# Patient Record
Sex: MALE | Race: WHITE
[De-identification: names, ages, dates, MRNs, and addresses within clinical notes are randomized per-mention and may not be internally consistent; named-entity substitution may affect disease eponyms.]

---

## 2017-08-14 LAB
ANION GAP SERPL CALC-SCNC: 5 MMOL/L (ref 3–11)
APPEARANCE UR: CLEAR
BASOPHILS # BLD: 0.04 K/UL (ref 0–0.2)
BASOPHILS NFR BLD: 0.6 %
BILIRUB UR-MCNC: (no result) MG/DL
BUN SERPL-MCNC: 26 MG/DL (ref 7–18)
BUN/CREAT SERPL: 25.6 (ref 10–20)
CALCIUM SERPL-MCNC: 8.9 MG/DL (ref 8.5–10.1)
CHLORIDE SERPL-SCNC: 106 MMOL/L (ref 98–107)
CO2 SERPL-SCNC: 29 MMOL/L (ref 21–32)
COLOR UR: YELLOW
COMPLETE: YES
CREAT CL PREDICTED SERPL C-G-VRATE: 110.8 ML/MIN
CREAT SERPL-MCNC: 1 MG/DL (ref 0.6–1.4)
EOSINOPHIL NFR BLD AUTO: 257 K/UL (ref 130–400)
GLUCOSE SERPL-MCNC: 117 MG/DL (ref 70–99)
HCT VFR BLD CALC: 43.9 % (ref 42–52)
IG%: 0.2 %
IMM GRANULOCYTES NFR BLD AUTO: 20.8 %
INR PPP: 1 (ref 0.9–1.1)
LYMPHOCYTES # BLD: 1.31 K/UL (ref 1.2–3.4)
MANUAL MICROSCOPIC REQUIRED?: NO
MCH RBC QN AUTO: 29.9 PG (ref 25–34)
MCHC RBC AUTO-ENTMCNC: 33.3 G/DL (ref 32–36)
MCV RBC AUTO: 90 FL (ref 80–100)
MONOCYTES NFR BLD: 8.6 %
NEUTROPHILS # BLD AUTO: 7.6 %
NEUTROPHILS NFR BLD AUTO: 62.2 %
NITRITE UR QL STRIP: (no result)
PARTIAL THROMBOPLASTIN RATIO: 1
PH UR STRIP: 6 [PH] (ref 4.5–7.5)
PMV BLD AUTO: 9.8 FL (ref 7.4–10.4)
POTASSIUM SERPL-SCNC: 4.1 MMOL/L (ref 3.5–5.1)
PROTHROMBIN TIME: 10.7 SECONDS (ref 9–12)
RBC # BLD AUTO: 4.88 M/UL (ref 4.7–6.1)
REVIEW REQ?: NO
SODIUM SERPL-SCNC: 140 MMOL/L (ref 136–145)
SP GR UR STRIP: 1.02 (ref 1–1.03)
URINE BILL WITH OR WITHOUT MIC: (no result)
UROBILINOGEN UR-MCNC: (no result) MG/DL
WBC # BLD AUTO: 6.3 K/UL (ref 4.8–10.8)

## 2017-08-14 NOTE — DIAGNOSTIC IMAGING REPORT
TWO VIEW CHEST



CLINICAL HISTORY: Preoperative examination.



FINDINGS: PA and lateral chest radiographs are compared to study dated

9/14/2015. The examination is degraded by large body habitus. The heart appears

enlarged. The pulmonary vasculature is noncongested. There is mild bibasilar

atelectasis.  The lungs and pleural spaces are otherwise clear. There is no

pneumothorax. The skeletal structures are osteopenic. Degenerative change is

seen throughout the thoracic spine.



IMPRESSION: Cardiac enlargement with no active disease in the chest.







Electronically signed by:  Rubén García M.D.

8/14/2017 2:00 PM



Dictated Date/Time:  8/14/2017 1:59 PM

## 2017-08-14 NOTE — PAT MEDICATION INSTRUCTIONS
Service Date


Aug 14, 2017.





Current Home Medication List


Aspirin Enteric Coated (Ecotrin Or Generic), 81 MG PO QAM


Atenolol (Tenormin), 25 MG PO QAM


Naproxen (Aleve), 440 MG PO BID PRN for rn


Simvastatin (Zocor), 20 MG PO QAM





Medication Instructions


For Your Scheduled Surgery 





- Check with surgeon for instructions: 


Naproxen (Aleve), 440 MG PO BID PRN for rn








- Take the following medications the morning of surgery with a sip of water:


Simvastatin (Zocor), 20 MG PO QAM


Aspirin Enteric Coated (Ecotrin Or Generic), 81 MG PO QAM (okay to continue per 

surgeon)


Atenolol (Tenormin), 25 MG PO QAM








If you have any questions please call us at [phone] or [phone] or 

[phone]

## 2017-08-15 LAB — EST. AVERAGE GLUCOSE BLD GHB EST-MCNC: 120 MG/DL

## 2017-09-06 NOTE — HISTORY AND PHYSICAL
History & Physical


Date


Sep 6, 2017.





Chief Complaint


Right knee pain





History of Present Illness


The patient is a 66 year old male with complaints of right knee pain. He states 

the pain has been going on for some time now. He describes the pain as constant 

dull ache and sharp at times. He has tried PT, cortisone injections, and NSAIDs 

with no relief. He would like to proceed with a Right TKA.





Past Medical/Surgical History


Medical Problems:


(1) Diabetes


(2) Hypertension


(3) Sleep apnea


(4) Hypercholesterolemia


(5) H/O kidney stones





PSHx:


(1) Right ankle ORIF


(2) hernia repair





Additional History


Hepatic Disease:  No


Endocrine Disorder:  No


Kidney Disease:  No


Hypertension:  Yes


Heart Disease:  No


Bleeding Tendencies:  No


Infectious Diseases:  No





Allergies


Coded Allergies:  


     No Known Allergies (Unverified , 8/14/17)





Home Medications


Scheduled


Aspirin Enteric Coated (Ecotrin Or Generic), 81 MG PO QAM


Metoprolol Succ (Toprol Xl) (Toprol-Xl), 50 MG PO QAM


Simvastatin (Zocor), 20 MG PO QAM





Scheduled PRN


Naproxen (Aleve), 440 MG PO BID PRN for rn





Physical Examination


Skin:  warm/dry, no rash


Eyes:  normal inspection, EOMI


ENT:  normal ENT inspection


Head:  normocephalic, atraumatic


Neck:  supple, no adenopathy


Respiratory/Chest:  lungs clear, normal breath sounds


Cardiovascular:  regular rate, rhythm, no murmur


Abdomen / GI:  normal bowel sounds, non tender


Extremities:  normal inspection, + pertinent finding (0-90 degrees ROM of the 

right knee with anterior and medial right knee pain ligaments are intact)


Neurologic/Psych:  alert, oriented x 3





Diagnosis


Primary osteoarthritis of the right knee





Plan of Treatment


Patient is scheduled for a Right total knee arthroplasty. He has failed 

conservative therapies that include NSAIDs, PT, and cortisone injections. He 

would like to proceed with a right total knee arthroplasty. Risks and benefits 

to surgery were discussed that include but not limited to Pain, DVT, stiffness, 

Needed for revision surgery, blood loss, blood vessel damage, infection, nerve 

damage, anesthesia risks, and death. He understands these risks and wishes to 

proceed. All questions were answered to his satisfaction. DVT prophylaxis - ASA 

81mg BID Discharge Home with home health.

## 2017-09-08 ENCOUNTER — HOSPITAL ENCOUNTER (INPATIENT)
Dept: HOSPITAL 45 - C.ACU | Age: 67
LOS: 1 days | Discharge: HOME HEALTH | DRG: 470 | End: 2017-09-09
Attending: ORTHOPAEDIC SURGERY | Admitting: ORTHOPAEDIC SURGERY
Payer: COMMERCIAL

## 2017-09-08 VITALS
SYSTOLIC BLOOD PRESSURE: 134 MMHG | DIASTOLIC BLOOD PRESSURE: 81 MMHG | OXYGEN SATURATION: 97 % | TEMPERATURE: 97.52 F | HEART RATE: 57 BPM

## 2017-09-08 VITALS
OXYGEN SATURATION: 96 % | DIASTOLIC BLOOD PRESSURE: 79 MMHG | TEMPERATURE: 97.7 F | SYSTOLIC BLOOD PRESSURE: 136 MMHG | HEART RATE: 64 BPM

## 2017-09-08 VITALS
HEART RATE: 86 BPM | DIASTOLIC BLOOD PRESSURE: 84 MMHG | TEMPERATURE: 97.7 F | OXYGEN SATURATION: 94 % | SYSTOLIC BLOOD PRESSURE: 123 MMHG

## 2017-09-08 VITALS
WEIGHT: 315 LBS | BODY MASS INDEX: 41.75 KG/M2 | BODY MASS INDEX: 41.75 KG/M2 | WEIGHT: 315 LBS | HEIGHT: 73 IN | HEIGHT: 73 IN

## 2017-09-08 VITALS
SYSTOLIC BLOOD PRESSURE: 135 MMHG | DIASTOLIC BLOOD PRESSURE: 90 MMHG | TEMPERATURE: 97.52 F | OXYGEN SATURATION: 94 % | HEART RATE: 82 BPM

## 2017-09-08 VITALS
OXYGEN SATURATION: 94 % | HEART RATE: 65 BPM | TEMPERATURE: 98.24 F | SYSTOLIC BLOOD PRESSURE: 149 MMHG | DIASTOLIC BLOOD PRESSURE: 86 MMHG

## 2017-09-08 VITALS
DIASTOLIC BLOOD PRESSURE: 89 MMHG | HEART RATE: 62 BPM | SYSTOLIC BLOOD PRESSURE: 184 MMHG | OXYGEN SATURATION: 96 % | TEMPERATURE: 98.06 F

## 2017-09-08 VITALS — SYSTOLIC BLOOD PRESSURE: 152 MMHG | OXYGEN SATURATION: 97 % | HEART RATE: 61 BPM | DIASTOLIC BLOOD PRESSURE: 90 MMHG

## 2017-09-08 VITALS — SYSTOLIC BLOOD PRESSURE: 142 MMHG | DIASTOLIC BLOOD PRESSURE: 81 MMHG | HEART RATE: 59 BPM | OXYGEN SATURATION: 96 %

## 2017-09-08 DIAGNOSIS — I10: ICD-10-CM

## 2017-09-08 DIAGNOSIS — M17.11: Primary | ICD-10-CM

## 2017-09-08 DIAGNOSIS — E78.00: ICD-10-CM

## 2017-09-08 DIAGNOSIS — Z79.82: ICD-10-CM

## 2017-09-08 DIAGNOSIS — Z79.899: ICD-10-CM

## 2017-09-08 DIAGNOSIS — E11.9: ICD-10-CM

## 2017-09-08 PROCEDURE — 0SRC0J9 REPLACEMENT OF RIGHT KNEE JOINT WITH SYNTHETIC SUBSTITUTE, CEMENTED, OPEN APPROACH: ICD-10-PCS | Performed by: ORTHOPAEDIC SURGERY

## 2017-09-08 RX ADMIN — FERROUS GLUCONATE SCH MG: 324 TABLET ORAL at 20:18

## 2017-09-08 RX ADMIN — CEFAZOLIN SCH MLS/HR: 10 INJECTION, POWDER, FOR SOLUTION INTRAVENOUS at 17:39

## 2017-09-08 RX ADMIN — OXYCODONE HYDROCHLORIDE SCH MG: 10 TABLET, FILM COATED, EXTENDED RELEASE ORAL at 22:02

## 2017-09-08 RX ADMIN — TRANEXAMIC ACID SCH MLS/HR: 100 INJECTION, SOLUTION INTRAVENOUS at 08:24

## 2017-09-08 RX ADMIN — ACETAMINOPHEN SCH MG: 500 TABLET, COATED ORAL at 14:11

## 2017-09-08 RX ADMIN — TRANEXAMIC ACID SCH MLS/HR: 100 INJECTION, SOLUTION INTRAVENOUS at 06:30

## 2017-09-08 RX ADMIN — CEFAZOLIN SCH MLS/HR: 10 INJECTION, POWDER, FOR SOLUTION INTRAVENOUS at 23:30

## 2017-09-08 RX ADMIN — DEXTROSE MONOHYDRATE, SODIUM CHLORIDE, AND POTASSIUM CHLORIDE SCH MLS/HR: 50; 4.5; 1.49 INJECTION, SOLUTION INTRAVENOUS at 23:30

## 2017-09-08 RX ADMIN — Medication SCH MG: at 22:02

## 2017-09-08 RX ADMIN — DEXTROSE MONOHYDRATE, SODIUM CHLORIDE, AND POTASSIUM CHLORIDE SCH MLS/HR: 50; 4.5; 1.49 INJECTION, SOLUTION INTRAVENOUS at 14:11

## 2017-09-08 RX ADMIN — ACETAMINOPHEN SCH MG: 500 TABLET, COATED ORAL at 22:03

## 2017-09-08 RX ADMIN — DOCUSATE SODIUM SCH MG: 100 CAPSULE, LIQUID FILLED ORAL at 22:02

## 2017-09-08 NOTE — DIAGNOSTIC IMAGING REPORT
RIGHT KNEE 1 OR 2 VIEWS ROUTINE



CLINICAL HISTORY: Postoperative evaluation. Osteoarthritis.    



COMPARISON: Right knee radiograph May 7, 2013.



FINDINGS:  Alignment of the total right knee arthroplasty is anatomic. There is

no fracture or unexpected radiopaque foreign body. Drains and skin staples are

present.



IMPRESSION: Expected findings following total right knee arthroplasty.







Electronically signed by:  Tai Casey M.D.

9/8/2017 11:54 AM



Dictated Date/Time:  9/8/2017 11:53 AM

## 2017-09-08 NOTE — ANESTHESIOLOGY PROGRESS NOTE
Anesthesia Post Op Note


Date & Time


Sep 8, 2017 at 11:56





Vital Signs


Pain Intensity:  0





Vital Signs Past 12 Hours








  Date Time  Temp Pulse Resp B/P (MAP) Pulse Ox O2 Delivery O2 Flow Rate FiO2


 


9/8/17 11:40 36.8 58 16 146/76 99 Nasal Cannula 3 


 


9/8/17 11:25 36.8 55 16 144/85 99 Nasal Cannula 3 


 


9/8/17 11:15  57 16 138/70 98 Nasal Cannula 3 


 


9/8/17 11:05  55 16 141/74 98 Oxymask 10 


 


9/8/17 10:55 36.5 58 16 134/75 98 Oxymask 10 


 


9/8/17 07:11 36.7 62 18 184/89 96 Room Air  











Notes


Mental Status:  alert / awake / arousable, participated in evaluation


Pt Amnestic to Procedure:  Yes


Nausea / Vomiting:  adequately controlled


Pain:  adequately controlled


Airway Patency, RR, SpO2:  stable & adequate


BP & HR:  stable & adequate


Hydration State:  stable & adequate


Neuraxial Anesthesia:  was administered, sensory block is resolving


Anesthetic Complications:  no major complications apparent

## 2017-09-08 NOTE — [facility] OPERATIVE REPORT
Operative Report


Operative Date


Sep 8, 2017.





Pre-Operative Diagnosis





Primary Osteoarthritis Right Knee





Post-Operative Diagnosis





Primary Osteoarthritis Right Knee





Procedure(s) Performed





Right Total Knee Arthroplasty





Surgeon


Dr. Nick Kingsley





Assistant Surgeon(s)


Marlon Jaimes PA-C





Estimated Blood Loss


20 mL





Findings


As above





Specimens





Permanent:





A. Right Knee Bone and Tissue





Drains


2 Hemovac





Anesthesia


spinal





Complication(s)


None





Disposition


Recovery Room / PACU





Indications


66-year-old male with end-stage osteoarthritis the left knee.  He has failed 

conservative measures including injection and anti-inflammatories and 

rehabilitation.  He wishes to proceed with total knee arthroplasty.





Description of Procedure


Risks benefits and alternatives of surgery including but not limited to 

infection DVT pain stiffness need for surgery damage to blood vessels damage to 

nerves or risks of anesthesia were discussed with the patient and she wished to 

proceed.  Patient was identified in the laterality was confirmed and marked.  

She received a preoperative antibiotic is also a spinal anesthetic and a 

abductor canal block.  A well-padded tourniquet was applied and then the limb 

was prepped and draped in standard manner with ChloraPrep.  The limb was 

exsanguinated and the tourniquet was inflated.  





I made a standard anterior incision.  I sharply incised the skin then utilized 

Bovie electrocautery as well as the aqua mantis to achieve hemostasis.  I made 

a medial parapatellar arthrotomy immobilized the patella laterally.  I then 

excised the anterior horns of the medial and lateral meniscus as well as the 

infrapatellar fat pad.  I elevated a portion of the MCL off of the tibia.  I 

then pinned into place a patient-matched distal femoral cutting guide and made 

my distal femoral resection.  





I then pinned into place a size the 5 in 1 cutting guide.  





I made my anterior, posterior and chamfer cuts.  I then excised the cruciates 

and the remaining portions of the menisci.  I then pinned into place a patient-

matched tibial cutting guide and made my tibial resection.  





I then pinned into place a size the trial tibia utilizing an alignment ricarda to 

confirm rotation.  





I then cut for the post.  Utilizing a lamina  and then removed 

posterior osteophytes off the femur.  I then placed a trial femur into position 

and cut for the trochlear component.  





I then sequentially trialed the polyethylene.  There was good soft tissue 

balancing and range of motion with this polyethylene.  I then prepared the 

patella with a freehand cut utilizing sagittal saw.  





I sized and drilled for the patella.  There was good tracking to the patella.  

No lateral release was needed.  All the trial components were removed.  The 

deep tissues were anesthetized with and ortho mix solution.  Then with Simplex 

HV with gentamicin cement I cemented my definitive components.  





Definitive components, Zaidi and Nephew Elias 2: 


Femur 8


Tibia 6


Poly 10


Patella 38 oval








A Betadine soak was performed.





A deep drain was placed.





 The arthrotomy was closed with interrupted #1 Vicryl suture subcutaneous 

tissue was closed with interrupted 2-0 Vicryl suture.





The skin was closed with staples.  











A Silverlon was placed.





Sterile dressings applied and the tourniquet was released.  All needle and 

sponge counts were correct at the end of the procedure patient was transferred 

to the PACU in stable condition without apparent complication.





The PA-C was necessary for assistance with procedure for assistance in 

positioning, prepping, draping, retraction and closure.


I attest to the content of the Intraoperative Record and any orders documented 

therein.  Any exceptions are noted below.

## 2017-09-09 VITALS
SYSTOLIC BLOOD PRESSURE: 138 MMHG | HEART RATE: 70 BPM | OXYGEN SATURATION: 97 % | TEMPERATURE: 97.52 F | DIASTOLIC BLOOD PRESSURE: 82 MMHG

## 2017-09-09 VITALS
OXYGEN SATURATION: 96 % | HEART RATE: 82 BPM | SYSTOLIC BLOOD PRESSURE: 131 MMHG | TEMPERATURE: 97.88 F | DIASTOLIC BLOOD PRESSURE: 79 MMHG

## 2017-09-09 VITALS — HEART RATE: 73 BPM | OXYGEN SATURATION: 96 % | DIASTOLIC BLOOD PRESSURE: 81 MMHG | SYSTOLIC BLOOD PRESSURE: 160 MMHG

## 2017-09-09 VITALS
DIASTOLIC BLOOD PRESSURE: 81 MMHG | SYSTOLIC BLOOD PRESSURE: 160 MMHG | TEMPERATURE: 97.52 F | OXYGEN SATURATION: 96 % | HEART RATE: 73 BPM

## 2017-09-09 LAB
ANION GAP SERPL CALC-SCNC: 8 MMOL/L (ref 3–11)
BUN SERPL-MCNC: 20 MG/DL (ref 7–18)
BUN/CREAT SERPL: 20 (ref 10–20)
CALCIUM SERPL-MCNC: 8.6 MG/DL (ref 8.5–10.1)
CHLORIDE SERPL-SCNC: 105 MMOL/L (ref 98–107)
CO2 SERPL-SCNC: 23 MMOL/L (ref 21–32)
CREAT CL PREDICTED SERPL C-G-VRATE: 110.8 ML/MIN
CREAT SERPL-MCNC: 1 MG/DL (ref 0.6–1.4)
EOSINOPHIL NFR BLD AUTO: 251 K/UL (ref 130–400)
GLUCOSE SERPL-MCNC: 139 MG/DL (ref 70–99)
HCT VFR BLD CALC: 41.2 % (ref 42–52)
MCH RBC QN AUTO: 28.7 PG (ref 25–34)
MCHC RBC AUTO-ENTMCNC: 32.3 G/DL (ref 32–36)
MCV RBC AUTO: 89 FL (ref 80–100)
PMV BLD AUTO: 9.6 FL (ref 7.4–10.4)
POTASSIUM SERPL-SCNC: 4.4 MMOL/L (ref 3.5–5.1)
RBC # BLD AUTO: 4.63 M/UL (ref 4.7–6.1)
SODIUM SERPL-SCNC: 136 MMOL/L (ref 136–145)
WBC # BLD AUTO: 14.43 K/UL (ref 4.8–10.8)

## 2017-09-09 RX ADMIN — OXYCODONE HYDROCHLORIDE SCH MG: 10 TABLET, FILM COATED, EXTENDED RELEASE ORAL at 08:49

## 2017-09-09 RX ADMIN — ACETAMINOPHEN SCH MG: 500 TABLET, COATED ORAL at 05:47

## 2017-09-09 RX ADMIN — ACETAMINOPHEN SCH MG: 500 TABLET, COATED ORAL at 14:11

## 2017-09-09 RX ADMIN — DEXTROSE MONOHYDRATE, SODIUM CHLORIDE, AND POTASSIUM CHLORIDE SCH MLS/HR: 50; 4.5; 1.49 INJECTION, SOLUTION INTRAVENOUS at 09:57

## 2017-09-09 RX ADMIN — Medication SCH MG: at 08:48

## 2017-09-09 RX ADMIN — DOCUSATE SODIUM SCH MG: 100 CAPSULE, LIQUID FILLED ORAL at 08:48

## 2017-09-09 RX ADMIN — FERROUS GLUCONATE SCH MG: 324 TABLET ORAL at 12:43

## 2017-09-09 RX ADMIN — FERROUS GLUCONATE SCH MG: 324 TABLET ORAL at 08:47

## 2017-09-09 NOTE — DISCHARGE INSTRUCTIONS
Discharge Instructions


Date of Service


Sep 9, 2017.





Admission


Reason for Admission:  Right Knee Osteoarthritis





Discharge


Discharge Diagnosis / Problem:  Right Knee Osteoarthritis





Discharge Goals


Goal(s):  Decrease discomfort, Improve function





Activity Recommendations


Activity Limitations:  per Instructions/Follow-up section


Weightbearing Status:  Right weightbearing (as tolerated)





.





Instructions / Follow-Up


Instructions / Follow-Up


ACTIVITY RECOMMENDATIONS:





SELF CARE INSTRUCTIONS AFTER TOTAL KNEE REPLACEMENT





A.  You may need to continue a physical therapy program after discharge from 

the hospital.  There are several options available to you. 


      Your doctor will assist you in selecting the best one for you.





   1.  An out-patient facility 2 to 3 times a week for therapy or home therapy.


   2.  Continue working on all exercises taught to you in the hospital.  Your


                 goals should be to increase bending of your knee to 90 degrees 

and


                 beyond and to fully straighten your knee.





B.  You may progress at your own pace from walking with a walker or crutches to 

a cane; then to no assistive devices.





C.   Make walking a part of your daily routine.  Be up as much as comfortable 

with rest periods throughout the day.  


      Rest with leg elevation is very important. 


      Use the ice wrap frequently for the first 3-4 weeks.





D.  There are no restrictions on activities.  You may ride in a car, shop, 

participate in household chores and all social activities.





E.  Wear the long elastic stockings (SHANNAN hose) 20 hours a day for 2 weeks after 

surgery.  


     They can be removed several times a day for laundering and for a bath.





F.  You may shower, no tub baths until cleared by your doctor.








SPECIAL CARE INSTRUCTIONS:





**VERY IMPORTANT TO READ AND REVIEW**





A.  There are a few signs you need to watch for after you are home.  Call  

[facility] if you notice any of the followin.  Increased severe knee pain.  Some pain is expected especially  when you 

exercise.


   2.  Increased swelling in your leg or knee; pain or swelling of the calf 

muscle in either lower leg.


   3.  Any fluid drainage from the incision.


   4.  Shortness of breath or chest pain.





B.  Please call [facility] at [phone] if you have any  

concerns or questions about your operation or recovery.  


     The doctor or his nurse will return your call promptly.





C.  You must take antibiotics before dental work, bladder, bowel or other 

surgery.  


      Your doctor will provide you with a permanent care to carry describing 

this precaution.





IMPORTANT:





*  REMEMBER TO TAKE ASPIRIN, 81 MG, TWICE DAILY FOR 4 WEEKS UNLESS OTHERWISE 

DIRECTED.  


   THIS IS YOUR BLOOD THINNER.





*  HIGH RISK PATIENTS MAY BE PRESCRIBED A STRONGER BLOOD THINNER.  


   THIS WILL BE PROVIDED AT DISCHARGE.





*  CALL IF INCREASED PAIN, REDNESS, DRAINAGE OR FEVER GREATER THAT 101.





*  WEAR SHANNAN HOSE 20 HOURS PER DAY FOR 2 WEEKS.





*  Silverlon-  This is a large adhesive bandage that contains silver ions.  

This helps your incision heal by fighting off bacteria and protecting it from 

the outside environment.  You are permitted to shower with this dressing.  This 

will remain on your incision for 7 days and then should be removed.  Some 

visible blood or drainage through the dressing window is normal.  If there is 

significant drainage or leaking noted before the 7 days notify your doctor's 

office immediately.  Once removed, keep incision clean and dry.  If there is 

any drainage or redness noted, please call your surgeon. 


  [phone].








FOLLOW UP VISIT:





If appointment is not already scheduled:





Please call [facility] to make a follow-up appointment for 


2 weeks after your surgery at [phone].





Current Hospital Diet


Patient's current hospital diet: Regular Diet





Discharge Diet


Recommended Diet:  Regular Diet





Procedures


Procedures Performed:  


Right Total Knee Arthroplasty





Pending Studies


Studies pending at discharge:  no





Laboratory Results





Hemoglobin A1c








Test


  17


12:58 Range/Units


 


 


Estimated Average Glucose 120   mg/dl


 


Hemoglobin A1c 5.8 H 4.5-5.6  %











Medical Emergencies








.


Who to Call and When:





Medical Emergencies:  If at any time you feel your situation is an emergency, 

please call 911 immediately.





.





Non-Emergent Contact


Non-Emergency issues call your:  Surgeon


Call Non-Emergent contact if:  temperature is above 101.5, your pain is not 

controlled, your pain is worsening, wound has increased drainage, wound has 

increased redness


.








"Provider Documentation" section prepared by Marlon Jaimes.








.





VTE Core Measure


Inpt VTE Proph given/why not?:  Other Anticoagulation, T.E.D. Stockings, SCD's





[place] Drug Monitoring Program


Search Results:  patient reviewed within database, no issues identified

## 2017-09-09 NOTE — ORTHOPEDIC PROGRESS NOTE
Orthopedic Progress Note


Date of Service


Sep 9, 2017.





Subjective


Post OP Day:  1


Reports: feeling well, Denies: complaints





Objective


calves soft nontender, N/V intact, dressing C/D/I, A&O x3, toes mobile











  Date Time  Temp Pulse Resp B/P (MAP) Pulse Ox O2 Delivery O2 Flow Rate FiO2


 


9/9/17 07:51 36.4 70 18 138/82 (100) 97 Room Air  


 


9/9/17 07:40      Room Air  


 


9/9/17 03:06 36.6 82 18 131/79 (96) 96 Room Air  


 


9/8/17 23:30      Room Air  


 


9/8/17 23:26 36.5 86 18 123/84 (97) 94 Room Air  


 


9/8/17 19:08 36.4 82 18 135/90 (105) 94 Room Air  


 


9/8/17 15:30      Room Air  


 


9/8/17 15:00 36.5 64 18 136/79 (98) 96 Room Air  


 


9/8/17 13:56  61 17 152/90 (110) 97 Nasal Cannula 2.0 


 


9/8/17 13:00  59 17 142/81 (101) 96 Nasal Cannula 2.0 


 


9/8/17 12:30 36.4 57 17 134/81 (98) 97 Nasal Cannula 2.0 


 


9/8/17 12:00     94 Nasal Cannula 2.0 


 


9/8/17 12:00 36.8 65 18 149/86 (107) 94 Nasal Cannula 2.0 


 


9/8/17 12:00     94 Nasal Cannula 2.0 








Laboratory Results 24 Hours:











Test


  9/9/17


06:23


 


Hematocrit 41.2 % 


 


Hemoglobin 13.3 g/dL 











Assessment & Plan


Assessment:


POD 1 s/p Right TKA


Plan:


PT/OT


Plan for home with [facility] possibly today.  Will need to go with drain due 

to output if leaving today.


Follow PT progression





Patient seen and examined, agree with above.








Inhouse Planning


Pain Management:  Oxycontin, Morphine, PO Tylenol, Oxy IR


DVT Prophylaxis:  TEDs, SCDs, ASA





Discharge Planning


Discharge Planning:  home with home health

## 2017-09-10 NOTE — DISCHARGE SUMMARY
DISCHARGE DIAGNOSIS:  Degenerative joint disease, right knee.

 

SECONDARY DIAGNOSES:  Diabetes mellitus, hypertension, sleep apnea,

hypercholesterolemia, and a history of renal calculi.

 

CONSULTS:  None.

 

COMPLICATIONS:  None.

 

PROCEDURES:  Right total knee arthroplasty performed by Dr. Kingsley on

09/08/2017.

 

BRIEF HISTORY:  As dictated in the history and physical.

 

HOSPITAL SUMMARY:  The patient was admitted on the above date and had the

above surgery performed, which he tolerated well.  On his first postoperative

day, he was feeling well and had no complaints.  Calves were soft and

nontender.  Neurovascularly intact.  Dressings clean, dry and intact.  Toes

were mobile.  Vital signs were stable.  He was afebrile and hemoglobin was

13.3.  He was started on physical therapy protocol and continued on DVT

prophylaxis and pain management.  He progressed with his physical therapy and

was ambulating well and went up and down steps without difficulty.  Dr. Kingsley saw the patient later that morning and after discussion, the patient

was comfortable in going home and was remaining stable and could do so.  He

was thusly discharged to home on 09/09/2017 with home health services.  For

further review, please see chart.

 

LAB AND X-RAY DATA:  As per chart.

 

DISCHARGE INSTRUCTIONS:  The patient was discharged to home in satisfactory

condition on 09/09/2017.

 

ACTIVITY:  Weightbearing as tolerated right lower extremity.  Follow TK

instruction sheets and special care instructions as noted.

 

Follow up with Dr. Kingsley and 2 weeks.  The patient is to call for

appointment if one has not been made for you.

 

DISCHARGE MEDICATIONS:  Acetaminophen 1000 mg p.o. q. 8 hours for 30 days,

OxyContin 10 mg p.o. q. 12 hours, oxycodone 5-10 mg p.o. q. 4 hours p.r.n.,

and senna 17.2 mg p.o. at bedtime.  Resume metoprolol 50 mg p.o. q.a.m.,

simvastatin 20 mg p.o. q.a.m., aspirin 81 mg p.o. b.i.d. for 30 days and

after 30 days, resume once daily dosing.  Stop taking naproxen.

## 2017-11-20 ENCOUNTER — HOSPITAL ENCOUNTER (OUTPATIENT)
Dept: HOSPITAL 45 - C.LAB | Age: 67
Discharge: HOME | End: 2017-11-20
Attending: ORTHOPAEDIC SURGERY
Payer: COMMERCIAL

## 2017-11-20 DIAGNOSIS — M17.12: Primary | ICD-10-CM

## 2017-11-20 LAB
ANION GAP SERPL CALC-SCNC: 6 MMOL/L (ref 3–11)
APPEARANCE UR: CLEAR
BACTERIA #/AREA URNS HPF: (no result) /[HPF]
BASOPHILS # BLD: 0.06 K/UL (ref 0–0.2)
BASOPHILS NFR BLD: 0.8 %
BILIRUB UR-MCNC: (no result) MG/DL
BUN SERPL-MCNC: 20 MG/DL (ref 7–18)
BUN/CREAT SERPL: 20.4 (ref 10–20)
CALCIUM SERPL-MCNC: 8.9 MG/DL (ref 8.5–10.1)
CHLORIDE SERPL-SCNC: 102 MMOL/L (ref 98–107)
CO2 SERPL-SCNC: 28 MMOL/L (ref 21–32)
COLOR UR: YELLOW
COMPLETE: YES
CREAT SERPL-MCNC: 0.98 MG/DL (ref 0.6–1.4)
EOSINOPHIL NFR BLD AUTO: 273 K/UL (ref 130–400)
GLUCOSE SERPL-MCNC: 93 MG/DL (ref 70–99)
HCT VFR BLD CALC: 46.2 % (ref 42–52)
IG%: 0.3 %
IMM GRANULOCYTES NFR BLD AUTO: 22.8 %
INR PPP: 1 (ref 0.9–1.1)
LYMPHOCYTES # BLD: 1.65 K/UL (ref 1.2–3.4)
MANUAL MICROSCOPIC REQUIRED?: YES
MCH RBC QN AUTO: 29.4 PG (ref 25–34)
MCHC RBC AUTO-ENTMCNC: 32.9 G/DL (ref 32–36)
MCV RBC AUTO: 89.4 FL (ref 80–100)
MONOCYTES NFR BLD: 9.2 %
NEUTROPHILS # BLD AUTO: 5.2 %
NEUTROPHILS NFR BLD AUTO: 61.7 %
NITRITE UR QL STRIP: (no result)
PARTIAL THROMBOPLASTIN RATIO: 1.1
PH UR STRIP: 6 [PH] (ref 4.5–7.5)
PMV BLD AUTO: 9.8 FL (ref 7.4–10.4)
POTASSIUM SERPL-SCNC: 4.4 MMOL/L (ref 3.5–5.1)
PROTHROMBIN TIME: 11 SECONDS (ref 9–12)
RBC # BLD AUTO: 5.17 M/UL (ref 4.7–6.1)
RBC #/AREA URNS HPF: (no result) /HPF (ref 0–4)
REVIEW REQ?: NO
SODIUM SERPL-SCNC: 136 MMOL/L (ref 136–145)
SP GR UR STRIP: 1.02 (ref 1–1.03)
URINE BILL WITH OR WITHOUT MIC: (no result)
UROBILINOGEN UR-MCNC: (no result) MG/DL
WBC # BLD AUTO: 7.25 K/UL (ref 4.8–10.8)
WBC #/AREA URNS HPF: (no result) /HPF (ref 0–5)

## 2017-11-21 LAB — EST. AVERAGE GLUCOSE BLD GHB EST-MCNC: 117 MG/DL

## 2017-12-04 NOTE — HISTORY AND PHYSICAL
History & Physical


Date


Dec 4, 2017.





Chief Complaint


Left knee pain





History of Present Illness


The patient is a 67 year old male with complaints of left knee pain. He states 

the pain has been going on for some time now. He describes the pain as constant

, dull, ache and sharp at times. He has tried PT, cortisone injections and 

NSAIDs with no relief. He would like to proceed with a left total knee 

arthroplasty.





Past Medical/Surgical History


Medical Problems:


(1) Diabetes


(2) Hypertension


(3) Right knee DJD


(4) Sleep apnea





Additional History


Hepatic Disease:  No


Endocrine Disorder:  Yes


Kidney Disease:  No


Hypertension:  Yes


Heart Disease:  No


Bleeding Tendencies:  No


Infectious Diseases:  No





Allergies


Coded Allergies:  


     No Known Allergies (Unverified , 11/3/17)





Home Medications


Scheduled


Acetaminophen (Tylenol), 1,000 MG PO PRN


Aspirin (Aspirin Ec), 81 MG PO QAM


Metoprolol Succ (Toprol Xl) (Toprol-Xl), 50 MG PO QAM


Simvastatin (Zocor), 20 MG PO QAM





Physical Examination


Skin:  warm/dry, no rash


Eyes:  normal inspection, EOMI


ENT:  normal ENT inspection


Head:  normocephalic, atraumatic


Neck:  supple, no adenopathy


Respiratory/Chest:  lungs clear, normal breath sounds


Cardiovascular:  regular rate, rhythm, no murmur


Abdomen / GI:  normal bowel sounds, non tender


Extremities:  normal inspection, + pertinent finding (Medial joint line 

tenderness. ligaments are intact. decreased ROM and strength.)


Neurologic/Psych:  no motor/sensory deficits, alert, oriented x 3





Diagnosis


Primary osteoarthritis of left knee





Plan of Treatment


Patient is scheduled for a left total knee arthroplasty. He has failed 

conservative therapies. He would like to proceed with scheduled surgery. Risks 

and benefits to surgery were discussed and they wish to proceed. All questions 

were answered to their satisfaction. ASA 81mg BID for DVT prophylaxis and home 

with home health.

## 2017-12-08 ENCOUNTER — HOSPITAL ENCOUNTER (INPATIENT)
Dept: HOSPITAL 45 - C.ACU | Age: 67
LOS: 2 days | Discharge: HOME HEALTH | DRG: 470 | End: 2017-12-10
Attending: ORTHOPAEDIC SURGERY | Admitting: ORTHOPAEDIC SURGERY
Payer: COMMERCIAL

## 2017-12-08 VITALS — HEART RATE: 67 BPM | DIASTOLIC BLOOD PRESSURE: 94 MMHG | SYSTOLIC BLOOD PRESSURE: 156 MMHG

## 2017-12-08 VITALS
SYSTOLIC BLOOD PRESSURE: 137 MMHG | DIASTOLIC BLOOD PRESSURE: 82 MMHG | OXYGEN SATURATION: 96 % | HEART RATE: 68 BPM | TEMPERATURE: 97.7 F

## 2017-12-08 VITALS
SYSTOLIC BLOOD PRESSURE: 125 MMHG | OXYGEN SATURATION: 95 % | TEMPERATURE: 98.96 F | HEART RATE: 74 BPM | DIASTOLIC BLOOD PRESSURE: 79 MMHG

## 2017-12-08 VITALS
DIASTOLIC BLOOD PRESSURE: 66 MMHG | SYSTOLIC BLOOD PRESSURE: 116 MMHG | TEMPERATURE: 97.88 F | HEART RATE: 87 BPM | OXYGEN SATURATION: 98 %

## 2017-12-08 VITALS — SYSTOLIC BLOOD PRESSURE: 146 MMHG | OXYGEN SATURATION: 97 % | HEART RATE: 65 BPM | DIASTOLIC BLOOD PRESSURE: 89 MMHG

## 2017-12-08 VITALS — HEART RATE: 78 BPM | SYSTOLIC BLOOD PRESSURE: 131 MMHG | DIASTOLIC BLOOD PRESSURE: 85 MMHG

## 2017-12-08 VITALS
HEIGHT: 72 IN | WEIGHT: 315 LBS | WEIGHT: 315 LBS | BODY MASS INDEX: 42.66 KG/M2 | BODY MASS INDEX: 42.66 KG/M2 | HEIGHT: 72 IN

## 2017-12-08 VITALS
DIASTOLIC BLOOD PRESSURE: 85 MMHG | TEMPERATURE: 97.88 F | HEART RATE: 72 BPM | OXYGEN SATURATION: 97 % | SYSTOLIC BLOOD PRESSURE: 135 MMHG

## 2017-12-08 VITALS
DIASTOLIC BLOOD PRESSURE: 73 MMHG | OXYGEN SATURATION: 94 % | HEART RATE: 68 BPM | TEMPERATURE: 98.6 F | SYSTOLIC BLOOD PRESSURE: 131 MMHG

## 2017-12-08 VITALS
TEMPERATURE: 98.06 F | HEART RATE: 69 BPM | DIASTOLIC BLOOD PRESSURE: 74 MMHG | SYSTOLIC BLOOD PRESSURE: 124 MMHG | OXYGEN SATURATION: 93 %

## 2017-12-08 VITALS — OXYGEN SATURATION: 93 %

## 2017-12-08 DIAGNOSIS — Z79.899: ICD-10-CM

## 2017-12-08 DIAGNOSIS — I10: ICD-10-CM

## 2017-12-08 DIAGNOSIS — G47.33: ICD-10-CM

## 2017-12-08 DIAGNOSIS — M17.12: Primary | ICD-10-CM

## 2017-12-08 DIAGNOSIS — E66.01: ICD-10-CM

## 2017-12-08 DIAGNOSIS — Z79.82: ICD-10-CM

## 2017-12-08 DIAGNOSIS — E78.5: ICD-10-CM

## 2017-12-08 DIAGNOSIS — Z87.442: ICD-10-CM

## 2017-12-08 PROCEDURE — 0SRD0J9 REPLACEMENT OF LEFT KNEE JOINT WITH SYNTHETIC SUBSTITUTE, CEMENTED, OPEN APPROACH: ICD-10-PCS | Performed by: ORTHOPAEDIC SURGERY

## 2017-12-08 RX ADMIN — STANDARDIZED SENNA CONCENTRATE SCH MG: 8.6 TABLET ORAL at 20:36

## 2017-12-08 RX ADMIN — ACETAMINOPHEN SCH MG: 500 TABLET, COATED ORAL at 14:01

## 2017-12-08 RX ADMIN — OXYCODONE HYDROCHLORIDE PRN MG: 5 TABLET ORAL at 20:33

## 2017-12-08 RX ADMIN — Medication SCH MG: at 20:36

## 2017-12-08 RX ADMIN — CEFAZOLIN SCH MLS/MIN: 10 INJECTION, POWDER, FOR SOLUTION INTRAVENOUS at 14:01

## 2017-12-08 RX ADMIN — DOCUSATE SODIUM SCH MG: 100 CAPSULE, LIQUID FILLED ORAL at 20:36

## 2017-12-08 RX ADMIN — CEFAZOLIN SCH MLS/MIN: 10 INJECTION, POWDER, FOR SOLUTION INTRAVENOUS at 21:49

## 2017-12-08 RX ADMIN — FERROUS GLUCONATE SCH MG: 324 TABLET ORAL at 13:19

## 2017-12-08 RX ADMIN — TRANEXAMIC ACID SCH MLS/MIN: 100 INJECTION, SOLUTION INTRAVENOUS at 06:30

## 2017-12-08 RX ADMIN — DEXTROSE MONOHYDRATE, SODIUM CHLORIDE, AND POTASSIUM CHLORIDE SCH MLS/HR: 50; 4.5; 1.49 INJECTION, SOLUTION INTRAVENOUS at 11:54

## 2017-12-08 RX ADMIN — CELECOXIB SCH MG: 200 CAPSULE ORAL at 20:36

## 2017-12-08 RX ADMIN — ACETAMINOPHEN SCH MG: 500 TABLET, COATED ORAL at 21:49

## 2017-12-08 RX ADMIN — OXYCODONE HYDROCHLORIDE PRN MG: 5 TABLET ORAL at 15:05

## 2017-12-08 RX ADMIN — TRANEXAMIC ACID SCH MLS/MIN: 100 INJECTION, SOLUTION INTRAVENOUS at 06:36

## 2017-12-08 RX ADMIN — FERROUS GLUCONATE SCH MG: 324 TABLET ORAL at 18:11

## 2017-12-08 RX ADMIN — DEXTROSE MONOHYDRATE, SODIUM CHLORIDE, AND POTASSIUM CHLORIDE SCH MLS/HR: 50; 4.5; 1.49 INJECTION, SOLUTION INTRAVENOUS at 21:47

## 2017-12-08 NOTE — DIAGNOSTIC IMAGING REPORT
L KNEE 1 OR 2 VIEWS ROUTINE



CLINICAL HISTORY: AP/LATERAL IN PACU LEFT KNEE postoperative



COMPARISON: None.



DISCUSSION: Patient is status post total left knee arthroplasty. Good contact

between prosthetic and underlying bone. Surgical drains are in position.

Expected soft tissue postoperative change



IMPRESSION: Anatomic alignment status post total left knee arthroplasty











The above report was generated using voice recognition software.  It may contain

grammatical, syntax or spelling errors.







Electronically signed by:  Kenton Gleason M.D.

12/8/2017 10:39 AM



Dictated Date/Time:  12/8/2017 10:38 AM

## 2017-12-08 NOTE — [facility] OPERATIVE REPORT
Operative Report


Operative Date


Dec 8, 2017.





Pre-Operative Diagnosis





Primary osteoarthritis of left knee





Post-Operative Diagnosis





same as pre-operative





Procedure(s) Performed





Left Total Knee Arthroplasty- Cemented





Surgeon


Dr. Nick Kingsley





Assistant Surgeon(s)


RAMIRO Granger





Estimated Blood Loss


20ml





Findings


As above





Specimens





Permanent Specimen





A: Left knee bone and tissue





Drains


2 Hemovac





Anesthesia


spinal





Complication(s)


None





Disposition


Recovery Room / PACU





Indications


67-year-old male long-standing degenerative joint disease left knee.  He is bone

-on-bone medial compartment.  His fell conservative measures including 

injection anti-inflammatories and rehabilitation.  He wishes to proceed with 

left total knee arthroplasty





Description of Procedure


Risks benefits and alternatives of surgery including but not limited to 

infection, DVT, pain, stiffness, need for surgery, damage to blood vessels, 

damage to nerves or risks of anesthesia were discussed with the patient and 

they wished to proceed.  The patient was identified and the laterality was 

confirmed and marked.  They received a preoperative antibiotic as well as a 

spinal anesthetic and an abductor canal block.  A well-padded tourniquet was 

applied and then the limb was prepped and draped in standard manner with 

ChloraPrep.  





The limb was exsanguinated and the tourniquet was inflated.





I made a standard anterior incision.  I sharply incised the skin then utilized 

Bovie electrocautery as well as the aqua mantis to achieve hemostasis.  I made 

a medial parapatellar arthrotomy immobilized the patella laterally.  I then 

excised the anterior horns of the medial and lateral meniscus as well as the 

infrapatellar fat pad.  I elevated a portion of the MCL off of the tibia.  I 

then pinned into place a patient-matched distal femoral cutting guide and made 

my distal femoral resection.  





I then pinned into place the 5 in 1 femoral cutting guide.  





I made my anterior, posterior and chamfer cuts.  I then excised the cruciates 

and the remaining portions of the menisci.  I then pinned into place a patient-

matched tibial cutting guide and made my tibial resection.  





I then pinned into place the tibial plate a utilizing alignment ricarda to confirm 

rotation.  





I then cut for the post.  Utilizing a lamina  and I then removed 

posterior osteophytes off the femur.  I then placed a trial femur into position 

and cut for the trochlear component.  





I then sequentially trialed to size the polyethylene until there was good soft 

tissue balancing and range of motion.  I then prepared the patella with a 

freehand cut utilizing sagittal saw.  





I sized and drilled for the patella.  





There is some mild lateral tracking the patella small lateral release was 

performed.





All the trial components were removed.  The deep tissues were anesthetized with 

an ortho mix solution.  Then with Simplex HV with gentamicin cement, I cemented 

my definitive components.  





Definitive components, [employer] 2: 


Femur 8


Tibia 7


Poly 11


Patella 35 oval





A betadine soak was performed.





A deep drain was placed.





The arthrotomy was closed with interrupted #1 Vicryl suture subcutaneous tissue 

was closed with interrupted 2-0 Vicryl suture.





The skin was closed with with  staples.  





A Silverlon was placed.





Sterile dressings were applied.  All needle and sponge counts were correct at 

the end of the procedure patient was transferred to the PACU in stable 

condition without apparent complication.





The PA-C was necessary for assistance with procedure for assistance in 

positioning, prepping, draping, retraction and closure.


I attest to the content of the Intraoperative Record and any orders documented 

therein.  Any exceptions are noted below.

## 2017-12-08 NOTE — ANESTHESIOLOGY PROGRESS NOTE
Anesthesia Post Op Note


Date & Time


Dec 8, 2017 at 10:49





Vital Signs


Pain Intensity:  0





Vital Signs Past 12 Hours








  Date Time  Temp Pulse Resp B/P (MAP) Pulse Ox O2 Delivery O2 Flow Rate FiO2


 


12/8/17 10:25 36.6 72 17 125/74 96 Nasal Cannula 2 


 


12/8/17 10:15  69 20 124/77 94 Nasal Cannula 2 


 


12/8/17 10:05  74 19 123/79 97 Nasal Cannula 2 


 


12/8/17 09:55  63 19 125/75 99 Oxymask 10 


 


12/8/17 09:45  63 17 116/67 98 Oxymask 10 


 


12/8/17 09:38 36.0 73 19 108/73 98 Oxymask 10 


 


12/8/17 06:05 36.5 68 20 137/82 96 Room Air  











Notes


Mental Status:  alert / awake / arousable, participated in evaluation


Pt Amnestic to Procedure:  Yes


Nausea / Vomiting:  adequately controlled


Pain:  adequately controlled


Airway Patency, RR, SpO2:  stable & adequate


BP & HR:  stable & adequate


Hydration State:  stable & adequate


Anesthetic Complications:  no major complications apparent

## 2017-12-09 VITALS — HEART RATE: 70 BPM | DIASTOLIC BLOOD PRESSURE: 90 MMHG | OXYGEN SATURATION: 96 % | SYSTOLIC BLOOD PRESSURE: 150 MMHG

## 2017-12-09 VITALS
TEMPERATURE: 97.88 F | HEART RATE: 68 BPM | SYSTOLIC BLOOD PRESSURE: 150 MMHG | DIASTOLIC BLOOD PRESSURE: 82 MMHG | OXYGEN SATURATION: 97 %

## 2017-12-09 VITALS
TEMPERATURE: 98.06 F | DIASTOLIC BLOOD PRESSURE: 94 MMHG | SYSTOLIC BLOOD PRESSURE: 164 MMHG | OXYGEN SATURATION: 90 % | HEART RATE: 70 BPM

## 2017-12-09 VITALS
SYSTOLIC BLOOD PRESSURE: 145 MMHG | TEMPERATURE: 97.88 F | OXYGEN SATURATION: 96 % | HEART RATE: 83 BPM | DIASTOLIC BLOOD PRESSURE: 82 MMHG

## 2017-12-09 VITALS
SYSTOLIC BLOOD PRESSURE: 147 MMHG | TEMPERATURE: 98.06 F | HEART RATE: 70 BPM | OXYGEN SATURATION: 96 % | DIASTOLIC BLOOD PRESSURE: 81 MMHG

## 2017-12-09 VITALS
SYSTOLIC BLOOD PRESSURE: 149 MMHG | HEART RATE: 70 BPM | DIASTOLIC BLOOD PRESSURE: 88 MMHG | TEMPERATURE: 98.78 F | OXYGEN SATURATION: 97 %

## 2017-12-09 VITALS — OXYGEN SATURATION: 97 %

## 2017-12-09 LAB
ANION GAP SERPL CALC-SCNC: 5 MMOL/L (ref 3–11)
BUN SERPL-MCNC: 20 MG/DL (ref 7–18)
BUN/CREAT SERPL: 18.4 (ref 10–20)
CALCIUM SERPL-MCNC: 8.3 MG/DL (ref 8.5–10.1)
CHLORIDE SERPL-SCNC: 103 MMOL/L (ref 98–107)
CO2 SERPL-SCNC: 27 MMOL/L (ref 21–32)
CREAT CL PREDICTED SERPL C-G-VRATE: 97.8 ML/MIN
CREAT SERPL-MCNC: 1.08 MG/DL (ref 0.6–1.4)
EOSINOPHIL NFR BLD AUTO: 220 K/UL (ref 130–400)
GLUCOSE SERPL-MCNC: 150 MG/DL (ref 70–99)
HCT VFR BLD CALC: 41.4 % (ref 42–52)
INR PPP: 1 (ref 0.9–1.1)
MCH RBC QN AUTO: 29.5 PG (ref 25–34)
MCHC RBC AUTO-ENTMCNC: 30 G/DL (ref 32–36)
MCV RBC AUTO: 98.6 FL (ref 80–100)
PMV BLD AUTO: 11.4 FL (ref 7.4–10.4)
POTASSIUM SERPL-SCNC: 4.3 MMOL/L (ref 3.5–5.1)
PROTHROMBIN TIME: 11 SECONDS (ref 9–12)
RBC # BLD AUTO: 4.2 M/UL (ref 4.7–6.1)
SODIUM SERPL-SCNC: 135 MMOL/L (ref 136–145)
WBC # BLD AUTO: 11.48 K/UL (ref 4.8–10.8)

## 2017-12-09 RX ADMIN — Medication SCH MG: at 20:54

## 2017-12-09 RX ADMIN — ACETAMINOPHEN SCH MG: 500 TABLET, COATED ORAL at 13:35

## 2017-12-09 RX ADMIN — MORPHINE SULFATE PRN MG: 2 INJECTION, SOLUTION INTRAMUSCULAR; INTRAVENOUS at 11:24

## 2017-12-09 RX ADMIN — CELECOXIB SCH MG: 200 CAPSULE ORAL at 20:54

## 2017-12-09 RX ADMIN — DOCUSATE SODIUM SCH MG: 100 CAPSULE, LIQUID FILLED ORAL at 08:32

## 2017-12-09 RX ADMIN — DOCUSATE SODIUM SCH MG: 100 CAPSULE, LIQUID FILLED ORAL at 20:54

## 2017-12-09 RX ADMIN — OXYCODONE HYDROCHLORIDE PRN MG: 5 TABLET ORAL at 09:49

## 2017-12-09 RX ADMIN — ACETAMINOPHEN SCH MG: 500 TABLET, COATED ORAL at 20:55

## 2017-12-09 RX ADMIN — FERROUS GLUCONATE SCH MG: 324 TABLET ORAL at 18:32

## 2017-12-09 RX ADMIN — DEXTROSE MONOHYDRATE, SODIUM CHLORIDE, AND POTASSIUM CHLORIDE SCH MLS/HR: 50; 4.5; 1.49 INJECTION, SOLUTION INTRAVENOUS at 06:24

## 2017-12-09 RX ADMIN — CELECOXIB SCH MG: 200 CAPSULE ORAL at 08:32

## 2017-12-09 RX ADMIN — OXYCODONE HYDROCHLORIDE PRN MG: 5 TABLET ORAL at 15:24

## 2017-12-09 RX ADMIN — ACETAMINOPHEN SCH MG: 500 TABLET, COATED ORAL at 06:15

## 2017-12-09 RX ADMIN — MORPHINE SULFATE SCH MG: 15 TABLET, EXTENDED RELEASE ORAL at 19:45

## 2017-12-09 RX ADMIN — Medication SCH TAB: at 08:32

## 2017-12-09 RX ADMIN — SIMVASTATIN SCH MG: 20 TABLET, FILM COATED ORAL at 08:32

## 2017-12-09 RX ADMIN — FERROUS GLUCONATE SCH MG: 324 TABLET ORAL at 12:00

## 2017-12-09 RX ADMIN — MORPHINE SULFATE SCH MG: 15 TABLET, EXTENDED RELEASE ORAL at 08:30

## 2017-12-09 RX ADMIN — Medication SCH MG: at 08:33

## 2017-12-09 RX ADMIN — MORPHINE SULFATE PRN MG: 2 INJECTION, SOLUTION INTRAMUSCULAR; INTRAVENOUS at 12:00

## 2017-12-09 RX ADMIN — FERROUS GLUCONATE SCH MG: 324 TABLET ORAL at 08:31

## 2017-12-09 RX ADMIN — STANDARDIZED SENNA CONCENTRATE SCH MG: 8.6 TABLET ORAL at 20:54

## 2017-12-09 RX ADMIN — OXYCODONE HYDROCHLORIDE PRN MG: 5 TABLET ORAL at 03:14

## 2017-12-09 RX ADMIN — METOPROLOL SUCCINATE SCH MG: 50 TABLET, EXTENDED RELEASE ORAL at 08:33

## 2017-12-09 RX ADMIN — OXYCODONE HYDROCHLORIDE PRN MG: 5 TABLET ORAL at 23:32

## 2017-12-09 NOTE — DISCHARGE INSTRUCTIONS
Discharge Instructions


Date of Service


Dec 9, 2017.





Admission


Reason for Admission:  Left Knee Degenerative Arthritis





Discharge


Discharge Diagnosis / Problem:  S/P left TKA





Discharge Goals


Goal(s):  Decrease discomfort, Improve function





Activity Recommendations


Activity Limitations:  per Instructions/Follow-up section





.





Instructions / Follow-Up


Instructions / Follow-Up


ACTIVITY RECOMMENDATIONS:





SELF CARE INSTRUCTIONS AFTER TOTAL KNEE REPLACEMENT





A.  You may need to continue a physical therapy program after discharge from 

the hospital.  There are several options available to you. 


      Your doctor will assist you in selecting the best one for you.





   1.  An out-patient facility 2 to 3 times a week for therapy or home therapy.


   2.  Continue working on all exercises taught to you in the hospital.  Your


                 goals should be to increase bending of your knee to 90 degrees 

and


                 beyond and to fully straighten your knee.





B.  You may progress at your own pace from walking with a walker or crutches to 

a cane; then to no assistive devices.





C.   Make walking a part of your daily routine.  Be up as much as comfortable 

with rest periods throughout the day.  


      Rest with leg elevation is very important. 


      Use the ice wrap frequently for the first 3-4 weeks.





D.  There are no restrictions on activities.  You may ride in a car, shop, 

participate in household chores and all social activities.





E.  Wear the long elastic stockings (SHANNAN hose) 20 hours a day for 2 weeks after 

surgery.  


     They can be removed several times a day for laundering and for a bath.





F.  You may shower, no tub baths until cleared by your doctor.








SPECIAL CARE INSTRUCTIONS:





**VERY IMPORTANT TO READ AND REVIEW**





A.  There are a few signs you need to watch for after you are home.  Call  

[facility] if you notice any of the followin.  Increased severe knee pain.  Some pain is expected especially  when you 

exercise.


   2.  Increased swelling in your leg or knee; pain or swelling of the calf 

muscle in either lower leg.


   3.  Any fluid drainage from the incision.


   4.  Shortness of breath or chest pain.





B.  Please call [facility] at [phone] if you have any  

concerns or questions about your operation or recovery.  


     The doctor or his nurse will return your call promptly.





C.  You must take antibiotics before dental work, bladder, bowel or other 

surgery.  


      Your doctor will provide you with a permanent care to carry describing 

this precaution.





IMPORTANT:





*  REMEMBER TO TAKE ASPIRIN, 81 MG, TWICE DAILY FOR 4 WEEKS UNLESS OTHERWISE 

DIRECTED.  


   THIS IS YOUR BLOOD THINNER.





*  CALL IF INCREASED PAIN, REDNESS, DRAINAGE OR FEVER GREATER THAT 101.





*  WEAR SHANNAN HOSE 20 HOURS PER DAY FOR 2 WEEKS.





*  YOU MAY HAVE A LARGE BAND-AID LIKE DRESSING (SILVERON).  THIS WILL  REMAIN 

ON YOUR INCISION FOR 7 DAYS, THEN CAN BE REMOVED. 


    IF INCISION IS LEAKING THROUGH DRESSING, CALL THE OFFICE [phone].








FOLLOW UP VISIT:





If appointment is not already scheduled:





Please call [facility] to make a follow-up appointment with 

Dr. Kingsley or his PA for 


2 weeks after your surgery at [phone].





Current Hospital Diet


Patient's current hospital diet: Regular Diet





Discharge Diet


Recommended Diet:  Regular Diet





Procedures


Procedures Performed:  


Left Total Knee Arthroplasty- Cemented





Pending Studies


Studies pending at discharge:  no





Laboratory Results





Hemoglobin A1c








Test


  17


13:40 Range/Units


 


 


Estimated Average Glucose 117   mg/dl


 


Hemoglobin A1c 5.7 H 4.5-5.6  %











Medical Emergencies








.


Who to Call and When:





Medical Emergencies:  If at any time you feel your situation is an emergency, 

please call 911 immediately.





.





Non-Emergent Contact


Non-Emergency issues call your:  Surgeon


Call Non-Emergent contact if:  temperature is above 101.5, your pain is 

worsening, wound has increased drainage, wound has increased redness


.








"Provider Documentation" section prepared by Rusty Barroso.








.





VTE Core Measure


Inpt VTE Proph given/why not?:  Other Anticoagulation





[place] Drug [place]


Search Results:  patient reviewed within database, no issues identified

## 2017-12-09 NOTE — ORTHOPEDIC PROGRESS NOTE
Orthopedic Progress Note


Date of Service


Dec 9, 2017.





Subjective


Post OP Day:  1


Reports: feeling well, pain controlled w PO medications, Denies: complaints, 

chest pain, SOB, nausea / vomiting, light headedness, calf pain





Objective


calves soft nontender, N/V intact, capillary refill less than 2 sec., dressing C

/D/I, A&O x3, toes mobile, hemovac drainage (265cc/175cc)











  Date Time  Temp Pulse Resp B/P (MAP) Pulse Ox O2 Delivery O2 Flow Rate FiO2


 


12/9/17 03:30 36.6 83 16 145/82 (103) 96 Room Air  


 


12/8/17 23:30 36.6 87 18 116/66 (83) 98 Room Air  


 


12/8/17 23:15      Room Air  


 


12/8/17 20:12 37.0 68 16 131/73 (92) 94 Room Air  


 


12/8/17 15:20 36.7 69 17 124/74 (91) 93 Room Air  


 


12/8/17 15:05     93 Room Air  


 


12/8/17 13:35  78 16 131/85 (100)    


 


12/8/17 12:52  67 16 156/94 (114)    


 


12/8/17 11:35  65 16 146/89 (108) 97 Nasal Cannula 2.0 


 


12/8/17 11:10 36.6 72 18 135/85 (102) 97 Nasal Cannula 2.0 


 


12/8/17 11:00      Nasal Cannula 2.0 


 


12/8/17 10:55     95 Nasal Cannula 2.0 


 


12/8/17 10:40 37.2 74 18 125/79 (94) 95 Nasal Cannula 2.0 


 


12/8/17 10:25 36.6 72 17 125/74 96 Nasal Cannula 2 


 


12/8/17 10:15  69 20 124/77 94 Nasal Cannula 2 


 


12/8/17 10:05  74 19 123/79 97 Nasal Cannula 2 


 


12/8/17 09:55  63 19 125/75 99 Oxymask 10 


 


12/8/17 09:45  63 17 116/67 98 Oxymask 10 


 


12/8/17 09:38 36.0 73 19 108/73 98 Oxymask 10 








Laboratory Results 24 Hours:











Test


  12/9/17


05:28


 


Hematocrit 41.4 % 


 


Hemoglobin 12.4 g/dL 


 


Prothromb Time International


Ratio 1.0 


 


 


Prothrombin Time 11.0 SECONDS 











Assessment & Plan


Assessment:


POD#1 Left TKA


Plan:


Medical Management


DVT - ASA


PT/OT


Discharge - Home with Home Health likely tomorrow








Inhouse Planning


Pain Management:  Celebrex, PO Tylenol, Oxy IR


DVT Prophylaxis:  TEDs, SCDs, ASA





Discharge Planning


Discharge Planning:  home with home health


Pain Management:  Celebrex, PO Tylenol, Oxy IR


DVT Prophylaxis:  TEDs, ASA


Therapy:  Physical Therapy

## 2017-12-10 VITALS
SYSTOLIC BLOOD PRESSURE: 119 MMHG | DIASTOLIC BLOOD PRESSURE: 71 MMHG | TEMPERATURE: 97.7 F | HEART RATE: 62 BPM | OXYGEN SATURATION: 96 %

## 2017-12-10 VITALS
TEMPERATURE: 97.7 F | OXYGEN SATURATION: 94 % | DIASTOLIC BLOOD PRESSURE: 92 MMHG | HEART RATE: 67 BPM | SYSTOLIC BLOOD PRESSURE: 158 MMHG

## 2017-12-10 VITALS
DIASTOLIC BLOOD PRESSURE: 71 MMHG | OXYGEN SATURATION: 96 % | HEART RATE: 62 BPM | SYSTOLIC BLOOD PRESSURE: 119 MMHG | TEMPERATURE: 97.7 F

## 2017-12-10 VITALS — SYSTOLIC BLOOD PRESSURE: 158 MMHG | DIASTOLIC BLOOD PRESSURE: 80 MMHG | HEART RATE: 78 BPM | OXYGEN SATURATION: 96 %

## 2017-12-10 VITALS — OXYGEN SATURATION: 96 %

## 2017-12-10 RX ADMIN — FERROUS GLUCONATE SCH MG: 324 TABLET ORAL at 12:30

## 2017-12-10 RX ADMIN — MORPHINE SULFATE SCH MG: 15 TABLET, EXTENDED RELEASE ORAL at 08:16

## 2017-12-10 RX ADMIN — Medication SCH MG: at 08:17

## 2017-12-10 RX ADMIN — Medication SCH TAB: at 08:17

## 2017-12-10 RX ADMIN — DOCUSATE SODIUM SCH MG: 100 CAPSULE, LIQUID FILLED ORAL at 08:17

## 2017-12-10 RX ADMIN — SIMVASTATIN SCH MG: 20 TABLET, FILM COATED ORAL at 08:18

## 2017-12-10 RX ADMIN — FERROUS GLUCONATE SCH MG: 324 TABLET ORAL at 08:17

## 2017-12-10 RX ADMIN — OXYCODONE HYDROCHLORIDE PRN MG: 5 TABLET ORAL at 12:30

## 2017-12-10 RX ADMIN — ACETAMINOPHEN SCH MG: 500 TABLET, COATED ORAL at 06:10

## 2017-12-10 RX ADMIN — OXYCODONE HYDROCHLORIDE PRN MG: 5 TABLET ORAL at 06:09

## 2017-12-10 RX ADMIN — CELECOXIB SCH MG: 200 CAPSULE ORAL at 08:18

## 2017-12-10 RX ADMIN — METOPROLOL SUCCINATE SCH MG: 50 TABLET, EXTENDED RELEASE ORAL at 08:16

## 2017-12-10 NOTE — ORTHOPEDIC PROGRESS NOTE
Orthopedic Progress Note


Date of Service


Dec 10, 2017.





Subjective


Post OP Day:  2


Reports: feeling well, pain controlled w PO medications, Denies: complaints, 

chest pain, SOB, nausea / vomiting, light headedness, calf pain





Objective


calves soft nontender, N/V intact, capillary refill less than 2 sec., dressing C

/D/I, A&O x3, toes mobile


Window of silverlon shows mild bloody tinge











  Date Time  Temp Pulse Resp B/P (MAP) Pulse Ox O2 Delivery O2 Flow Rate FiO2


 


12/9/17 23:30      Room Air  


 


12/9/17 23:10 37.1 70 18 149/88 (108) 97 Room Air  


 


12/9/17 15:15      Room Air  


 


12/9/17 15:14 36.7 70 16 164/94 (117) 90 Room Air  


 


12/9/17 12:15  70   96   


 


12/9/17 11:58 36.6 68 16 150/82 (104) 97 Room Air  


 


12/9/17 08:30     97 Room Air  


 


12/9/17 07:18 36.7 70 16 147/81 (103) 96 Room Air  











Assessment & Plan


Assessment:


POD#2 Left TKA


Plan:


Medical Management


DVT - ASA


PT/OT


Discharge - Home with Home Health








Inhouse Planning


Pain Management:  Celebrex, PO Tylenol, Oxy IR


DVT Prophylaxis:  TEDs, SCDs, ASA





Discharge Planning


Discharge Planning:  home with home health


Pain Management:  Celebrex, PO Tylenol, Oxy IR


DVT Prophylaxis:  TEDs, ASA


Therapy:  Physical Therapy

## 2017-12-12 NOTE — DISCHARGE SUMMARY
Orthopedic Discharge Summary


Admission Date/Reason


Dec 8, 2017 at 07:05


Left Knee Degenerative Arthritis.





Discharge Date/Disposition


Dec 10, 2017


Home with services





Diagnosis


Principal Diagnosis:


S/P left TKA





Medication Reconciliation


as per discharge instructions





Admission Physical Exam


As per Admitting History & Physical.





Hospital Course


POD#1 patient was feeling well with no complaints. His dressing was clean, dry 

and intact. He states he had minimal pain with walking. He was able to walk up 

and down the hallway with no pain. We were awaiting authorization for home 

health services. POD#2 patient was feeling well with no complaints. His 

dressing was clean, dry and intact. He states he had minimal pain with walking. 

He was doing well with PT. He will be discharged today home with home health 

services. He will follow up in the office in 2 weeks for new x-rays and staple 

removal.





Discharge Instructions


Please refer to the electronic Patient Visit Report (Discharge Instructions) 

for additional information.

## 2017-12-27 ENCOUNTER — HOSPITAL ENCOUNTER (OUTPATIENT)
Dept: HOSPITAL 45 - C.LAB1850 | Age: 67
Discharge: HOME | End: 2017-12-27
Attending: PHYSICIAN ASSISTANT
Payer: COMMERCIAL

## 2017-12-27 DIAGNOSIS — Z47.1: Primary | ICD-10-CM

## 2017-12-27 DIAGNOSIS — Z96.651: ICD-10-CM

## 2017-12-27 LAB
BASOPHILS # BLD: 0.06 K/UL (ref 0–0.2)
BASOPHILS NFR BLD: 0.7 %
BUN SERPL-MCNC: 22 MG/DL (ref 7–18)
CALCIUM SERPL-MCNC: 9.2 MG/DL (ref 8.5–10.1)
CO2 SERPL-SCNC: 27 MMOL/L (ref 21–32)
CREAT SERPL-MCNC: 1.07 MG/DL (ref 0.6–1.4)
EOS ABS #: 0.39 K/UL (ref 0–0.5)
EOSINOPHIL NFR BLD AUTO: 354 K/UL (ref 130–400)
GLUCOSE SERPL-MCNC: 97 MG/DL (ref 70–99)
HCT VFR BLD CALC: 43.5 % (ref 42–52)
HGB BLD-MCNC: 14.3 G/DL (ref 14–18)
IG#: 0.01 K/UL (ref 0–0.02)
IMM GRANULOCYTES NFR BLD AUTO: 15.8 %
INR PPP: 1 (ref 0.9–1.1)
LYMPHOCYTES # BLD: 1.41 K/UL (ref 1.2–3.4)
MCH RBC QN AUTO: 28.9 PG (ref 25–34)
MCHC RBC AUTO-ENTMCNC: 32.9 G/DL (ref 32–36)
MCV RBC AUTO: 87.9 FL (ref 80–100)
MONO ABS #: 0.69 K/UL (ref 0.11–0.59)
MONOCYTES NFR BLD: 7.7 %
NEUT ABS #: 6.37 K/UL (ref 1.4–6.5)
NEUTROPHILS # BLD AUTO: 4.4 %
NEUTROPHILS NFR BLD AUTO: 71.3 %
PMV BLD AUTO: 10.2 FL (ref 7.4–10.4)
POTASSIUM SERPL-SCNC: 4.1 MMOL/L (ref 3.5–5.1)
PTT PATIENT: 26.3 SECONDS (ref 21–31)
RED CELL DISTRIBUTION WIDTH CV: 14.7 % (ref 11.5–14.5)
RED CELL DISTRIBUTION WIDTH SD: 47.3 FL (ref 36.4–46.3)
SODIUM SERPL-SCNC: 133 MMOL/L (ref 136–145)
WBC # BLD AUTO: 8.93 K/UL (ref 4.8–10.8)

## 2018-01-02 NOTE — HISTORY AND PHYSICAL
History & Physical


Date


Jan 2, 2018.





Chief Complaint


Right knee pain





History of Present Illness


The patient is a 67 year old male with complaints of right knee pain. He denies 

any trauma. He notices his knee cap fall to the side when walking up and down 

stairs. He previous had a Right total knee arthroplasty about 4 months ago. He 

is scheduled for a Right knee retinacular repair with possible poly exchange.





Past Medical/Surgical History


Medical Problems:


(1) Diabetes


(2) Hypertension


(3) Left knee DJD


(4) Right knee DJD


(5) Sleep apnea








Additional History


Hepatic Disease:  No


Endocrine Disorder:  No


Kidney Disease:  No


Hypertension:  Yes


Heart Disease:  No


Bleeding Tendencies:  No


Infectious Diseases:  No





Allergies


Coded Allergies:  


     No Known Allergies (Unverified , 12/8/17)





Home Medications


Scheduled


Acetaminophen (Sb Non-Aspirin Extra Stre), 1,000 MG PO Q8H


Aspirin (Aspirin EC Low Dose), 81 MG PO BID


Celecoxib (Celebrex), 200 MG PO BID


Metoprolol Succ (Toprol Xl) (Toprol-Xl), 50 MG PO QAM


Morphine Sulfate (Morphine Sulfate ER), 15 MG PO Q12H


Simvastatin (Zocor), 20 MG PO QAM





Scheduled PRN


Ondansetron Hcl (Zofran), 8 MG PO Q8 PRN for Nausea


Oxycodone HCl (Oxycodone HCl), 5-10 MG PO Q4H PRN for Pain





Physical Examination


Skin:  warm/dry


Eyes:  EOMI


ENT:  normal ENT inspection


Head:  normocephalic, atraumatic


Neck:  supple, no adenopathy


Respiratory/Chest:  normal breath sounds


Cardiovascular:  regular rate, rhythm, no murmur


Abdomen / GI:  normal bowel sounds, non tender


Extremities:  normal inspection, + pertinent finding


Neurologic/Psych:  alert, oriented x 3





Diagnosis


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





Plan of Treatment


Patient is scheduled for a Right knee retinacular repair with possible poly-

exchange. Risks and benefits to surgery were discussed with the patient and he 

wishes to proceed with scheduled surgery. All questions were answered to his 

satisfaction. He will be placed on ASA 81mg BID for DVT Prophylaxis. He would 

like to go home with Home health.

## 2018-01-04 ENCOUNTER — HOSPITAL ENCOUNTER (INPATIENT)
Dept: HOSPITAL 45 - C.ACU | Age: 68
LOS: 1 days | Discharge: HOME | DRG: 502 | End: 2018-01-05
Attending: ORTHOPAEDIC SURGERY | Admitting: ORTHOPAEDIC SURGERY
Payer: COMMERCIAL

## 2018-01-04 VITALS
HEIGHT: 73 IN | BODY MASS INDEX: 41.75 KG/M2 | BODY MASS INDEX: 41.75 KG/M2 | WEIGHT: 315 LBS | BODY MASS INDEX: 41.75 KG/M2 | WEIGHT: 315 LBS | HEIGHT: 73 IN

## 2018-01-04 VITALS
TEMPERATURE: 98.24 F | DIASTOLIC BLOOD PRESSURE: 81 MMHG | SYSTOLIC BLOOD PRESSURE: 148 MMHG | HEART RATE: 87 BPM | OXYGEN SATURATION: 93 %

## 2018-01-04 VITALS
TEMPERATURE: 97.88 F | SYSTOLIC BLOOD PRESSURE: 166 MMHG | OXYGEN SATURATION: 95 % | HEART RATE: 65 BPM | DIASTOLIC BLOOD PRESSURE: 89 MMHG

## 2018-01-04 VITALS
SYSTOLIC BLOOD PRESSURE: 149 MMHG | OXYGEN SATURATION: 95 % | TEMPERATURE: 98.06 F | HEART RATE: 87 BPM | DIASTOLIC BLOOD PRESSURE: 93 MMHG

## 2018-01-04 VITALS
OXYGEN SATURATION: 97 % | DIASTOLIC BLOOD PRESSURE: 90 MMHG | HEART RATE: 65 BPM | SYSTOLIC BLOOD PRESSURE: 144 MMHG | TEMPERATURE: 97.34 F

## 2018-01-04 VITALS
TEMPERATURE: 98.06 F | OXYGEN SATURATION: 97 % | SYSTOLIC BLOOD PRESSURE: 123 MMHG | HEART RATE: 57 BPM | DIASTOLIC BLOOD PRESSURE: 78 MMHG

## 2018-01-04 VITALS — OXYGEN SATURATION: 95 %

## 2018-01-04 DIAGNOSIS — E11.9: ICD-10-CM

## 2018-01-04 DIAGNOSIS — G47.30: ICD-10-CM

## 2018-01-04 DIAGNOSIS — I10: ICD-10-CM

## 2018-01-04 DIAGNOSIS — M23.632: ICD-10-CM
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
## 2018-01-04 DIAGNOSIS — T84.092A: Primary | ICD-10-CM
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
## 2018-01-04 PROCEDURE — 0LQQ0ZZ REPAIR RIGHT KNEE TENDON, OPEN APPROACH: ICD-10-PCS | Performed by: ORTHOPAEDIC SURGERY

## 2018-01-04 PROCEDURE — 0MDN0ZZ EXTRACTION OF RIGHT KNEE BURSA AND LIGAMENT, OPEN APPROACH: ICD-10-PCS | Performed by: ORTHOPAEDIC SURGERY

## 2018-01-04 RX ADMIN — DOCUSATE SODIUM SCH MG: 100 CAPSULE, LIQUID FILLED ORAL at 20:57

## 2018-01-04 RX ADMIN — Medication SCH MG: at 20:57

## 2018-01-04 RX ADMIN — OXYCODONE HYDROCHLORIDE SCH MG: 10 TABLET, FILM COATED, EXTENDED RELEASE ORAL at 20:57

## 2018-01-04 RX ADMIN — ACETAMINOPHEN SCH MG: 500 TABLET, COATED ORAL at 21:45

## 2018-01-04 RX ADMIN — CELECOXIB SCH MG: 200 CAPSULE ORAL at 20:57

## 2018-01-04 RX ADMIN — FERROUS GLUCONATE SCH MG: 324 TABLET ORAL at 19:27

## 2018-01-04 RX ADMIN — DEXTROSE MONOHYDRATE, SODIUM CHLORIDE, AND POTASSIUM CHLORIDE SCH MLS/HR: 50; 4.5; 1.49 INJECTION, SOLUTION INTRAVENOUS at 21:45

## 2018-01-04 RX ADMIN — CEFAZOLIN SCH MLS/MIN: 10 INJECTION, POWDER, FOR SOLUTION INTRAVENOUS at 21:46

## 2018-01-04 NOTE — [facility] OPERATIVE REPORT
Operative Report


Operative Date


Jan 4, 2018.





Pre-Operative Diagnosis





Right Knee Retinacular Tear Status Post Right Total Knee Arthroplasty





Post-Operative Diagnosis





Right Knee Retinacular Tear Status Post Right Total Knee Arthroplasty





Procedure(s) Performed





Right Knee Medial Retinacular Repair





Surgeon


Dr. Kingsley





Assistant Surgeon(s)


RAMIRO Granger





Estimated Blood Loss


20 ML





Findings


As above





Specimens





Culture#1--Right Knee Synovial Fluid--sent for STAT gram stain, routine culture 


and sensitivity, aerobes and anaerobes--sent to lab at 1417











Culture #2--Right Knee Tissue--sent for STAT gram stain, routine culture and 


sensitivity, aerobes and anaerobes--sent to lab at 1417





Drains


none





Anesthesia


spinal





Complication(s)


None





Disposition


Recovery Room / PACU





Indications


Patient is a 59-year-old male who is about 4 months out from a right total knee 

arthroplasty.  Done very well postoperatively.  Over the last week he had 

increasing pain and dysfunction the right knee.  He is about 3 weeks status 

post left total knee arthroplasty and had not been having any difficulties with 

the right knee when we replaced his left knee.  On examination he had 

disruption of the medial retinaculum and lateral tracking to the patella.  His 

laboratory values were normal.  He presents for medial retinacular repair.





Description of Procedure


Risks benefits and alternatives of surgery including but not limited to 

infection, DVT, pain, stiffness, need for surgery, damage to blood vessels, 

damage to nerves or risks of anesthesia were discussed with the patient and 

they wished to proceed.  The patient was identified and the laterality was 

confirmed and marked.  They received a preoperative antibiotic as well as a 

spinal anesthetic and an abductor canal block.  A well-padded tourniquet was 

applied and then the limb was prepped and draped in standard manner with 

ChloraPrep.  





The limb was exsanguinated and the tourniquet was inflated.





I made a standard anterior incision.  I sharply incised the skin then utilized 

Bovie electrocautery.  There was disruption of mostly medial retinacular 

repair.  The most distal aspect along the patella tendon was intact and the 

most proximal aspect of the quad tendon was intact.  There was some 

serosanguineous fluid encountered.  This fluid was cultured did not appear 

infectious in anyway.  The tissue in the region of each likely place replaced 

Vicryl had friable tissue.  Again there was no purulent material no signs or 

symptoms of infection.  I question whether he had a reaction to the dye Vicryl 

as there was friable tissue at regular intervals consistent with placement of 

the sutures.  This region was debrided and some of the deep tissue was sent for 

Gram stain and culture.  Intraoperative Gram stain was negative.  Wound was 

thoroughly irrigated and a Betadine soak was performed.  The arthrotomy was 

then repaired with interrupted #2 ultra braid suture.  The subcutaneous tissues 

closed with interrupted Polysorb suture and the skin was closed with staples.


A provisional wound VAC was placed.  All needle and sponge counts were correct 

at the end of the procedure patient was transferred to the PACU in stable 

condition without apparent complication.





The PA-C was necessary for assistance with procedure for assistance in 

positioning, prepping, draping, retraction and closure.


I attest to the content of the Intraoperative Record and any orders documented 

therein.  Any exceptions are noted below.

## 2018-01-04 NOTE — DISCHARGE INSTRUCTIONS
Discharge Instructions


Date of Service


2018.





Admission


Reason for Admission:  Failed Arthrotomy Right Knee





Discharge


Discharge Diagnosis / Problem:  S/P Right knee retinacular repair





Discharge Goals


Goal(s):  Decrease discomfort, Improve function





Activity Recommendations


Activity Limitations:  per Instructions/Follow-up section





.





Instructions / Follow-Up


Instructions / Follow-Up


ACTIVITY RECOMMENDATIONS:





SELF CARE INSTRUCTIONS AFTER TOTAL KNEE REPLACEMENT





A. NON-WEIGHTBEARING FOR RIGHT LOWER EXTREMITY AT ALL TIMES. 





B. NO PHYSICAL THERAPY FOR 6 WEEKS. 





C. REMAIN IN IMMOBILIZER AT ALL TIMES FOR 6 WEEKS.





D.  There are no restrictions on activities.  You may ride in a car, shop, 

participate in household chores and all social activities.





E.  Wear the long elastic stockings (SHANNAN hose) 20 hours a day for 2 weeks after 

surgery.  


     They can be removed several times a day for laundering and for a bath.





F.  You may shower, no tub baths until cleared by your doctor.








SPECIAL CARE INSTRUCTIONS:





**VERY IMPORTANT TO READ AND REVIEW**





A.  There are a few signs you need to watch for after you are home.  Call  

[facility] if you notice any of the followin.  Increased severe knee pain.  Some pain is expected especially  when you 

exercise.


   2.  Increased swelling in your leg or knee; pain or swelling of the calf 

muscle in either lower leg.


   3.  Any fluid drainage from the incision.


   4.  Shortness of breath or chest pain.





B.  Please call [facility] at [phone] if you have any  

concerns or questions about your operation or recovery.  


     The doctor or his nurse will return your call promptly.





C.  You must take antibiotics before dental work, bladder, bowel or other 

surgery.  


      Your doctor will provide you with a permanent care to carry describing 

this precaution.





IMPORTANT:





*  REMEMBER TO TAKE ASPIRIN, 81 MG, TWICE DAILY FOR 4 WEEKS UNLESS OTHERWISE 

DIRECTED.  


   THIS IS YOUR BLOOD THINNER.





*  CALL IF INCREASED PAIN, REDNESS, DRAINAGE OR FEVER GREATER THAT 101.





*  WEAR SHANNAN HOSE 20 HOURS PER DAY FOR 2 WEEKS.





*  YOU MAY HAVE A LARGE BAND-AID LIKE DRESSING (PREVENA).  THIS WILL  REMAIN ON 

YOUR INCISION FOR 7 DAYS, THEN CAN BE REMOVED. 


    IF INCISION IS LEAKING THROUGH DRESSING, CALL THE OFFICE [phone].








FOLLOW UP VISIT:





If appointment is not already scheduled:





Please call [facility] to make a follow-up appointment for 2 

weeks after your surgery at [phone].





Current Hospital Diet


Patient's current hospital diet: Regular Diet





Discharge Diet


Recommended Diet:  Regular Diet





Procedures


Procedures Performed:  


Right Knee Medial Retinacular Repair





Pending Studies


Studies pending at discharge:  no





Laboratory Results





Hemoglobin A1c








Test


  17


13:40 Range/Units


 


 


Estimated Average Glucose 117   mg/dl


 


Hemoglobin A1c 5.7 H 4.5-5.6  %











Medical Emergencies








.


Who to Call and When:





Medical Emergencies:  If at any time you feel your situation is an emergency, 

please call 911 immediately.





.





Non-Emergent Contact


Non-Emergency issues call your:  Surgeon


Call Non-Emergent contact if:  temperature is above 101.5, your pain is 

worsening, wound has increased drainage, wound has increased redness


.








"Provider Documentation" section prepared by Rusty Barroso.








.





VTE Core Measure


Inpt VTE Proph given/why not?:  Other Anticoagulation (ASPIRIN 81MG BID)





[place]


Search Results:  patient reviewed within database, no issues identified

## 2018-01-04 NOTE — ANESTHESIOLOGY PROGRESS NOTE
Anesthesia Post Op Note


Date & Time


Jan 4, 2018 at 16:28





Vital Signs


Pain Intensity:  0





Vital Signs Past 12 Hours








  Date Time  Temp Pulse Resp B/P (MAP) Pulse Ox O2 Delivery O2 Flow Rate FiO2


 


1/4/18 16:15  60 16 130/75 97 Nasal Cannula 2 


 


1/4/18 16:05  60 16 129/72 99 Oxymask 5 


 


1/4/18 15:55 36.3 59 16 130/78 99 Oxymask 10 


 


1/4/18 11:35 36.6 65 18 166/89 (114) 95 Room Air  











Notes


Mental Status:  alert / awake / arousable, participated in evaluation


Pt Amnestic to Procedure:  Yes


Nausea / Vomiting:  adequately controlled


Pain:  adequately controlled


Airway Patency, RR, SpO2:  stable & adequate


BP & HR:  stable & adequate


Hydration State:  stable & adequate


Neuraxial Anesthesia:  was administered, sensory block is resolving


Anesthetic Complications:  no major complications apparent

## 2018-01-04 NOTE — DIAGNOSTIC IMAGING REPORT
R KNEE 1 OR 2 VIEWS ROUTINE



CLINICAL HISTORY: 67 years-old Male presenting with AP/LATERAL IN PACU RIGHT

KNEE. 



TECHNIQUE: Frontal and lateral views of the right knee were obtained. 



COMPARISON: 9/8/2017.



FINDINGS:

Postsurgical changes of total right knee arthroplasty with patellar resurfacing.

Intra-articular and soft tissue emphysema as well as overlying surgical skin

staples noted. No malalignment or hardware complication. No periprosthetic

fracture.



IMPRESSION:

Expected postsurgical appearance status post total right knee arthroplasty.







Electronically signed by:  Nick Marinelli M.D.

1/4/2018 4:22 PM



Dictated Date/Time:  1/4/2018 4:21 PM

## 2018-01-05 VITALS — DIASTOLIC BLOOD PRESSURE: 80 MMHG | OXYGEN SATURATION: 96 % | SYSTOLIC BLOOD PRESSURE: 157 MMHG | HEART RATE: 85 BPM

## 2018-01-05 VITALS
DIASTOLIC BLOOD PRESSURE: 78 MMHG | TEMPERATURE: 98.06 F | OXYGEN SATURATION: 95 % | HEART RATE: 68 BPM | SYSTOLIC BLOOD PRESSURE: 124 MMHG

## 2018-01-05 VITALS
HEART RATE: 73 BPM | TEMPERATURE: 97.88 F | OXYGEN SATURATION: 93 % | DIASTOLIC BLOOD PRESSURE: 75 MMHG | SYSTOLIC BLOOD PRESSURE: 149 MMHG

## 2018-01-05 VITALS — OXYGEN SATURATION: 95 %

## 2018-01-05 VITALS
OXYGEN SATURATION: 95 % | DIASTOLIC BLOOD PRESSURE: 98 MMHG | TEMPERATURE: 97.7 F | HEART RATE: 74 BPM | SYSTOLIC BLOOD PRESSURE: 164 MMHG

## 2018-01-05 VITALS
HEART RATE: 68 BPM | OXYGEN SATURATION: 95 % | DIASTOLIC BLOOD PRESSURE: 78 MMHG | TEMPERATURE: 98.06 F | SYSTOLIC BLOOD PRESSURE: 124 MMHG

## 2018-01-05 LAB
BUN SERPL-MCNC: 19 MG/DL (ref 7–18)
CALCIUM SERPL-MCNC: 8.5 MG/DL (ref 8.5–10.1)
CO2 SERPL-SCNC: 23 MMOL/L (ref 21–32)
CREAT SERPL-MCNC: 1.1 MG/DL (ref 0.6–1.4)
EOSINOPHIL NFR BLD AUTO: 302 K/UL (ref 130–400)
GLUCOSE SERPL-MCNC: 144 MG/DL (ref 70–99)
HCT VFR BLD CALC: 37.6 % (ref 42–52)
HGB BLD-MCNC: 12.2 G/DL (ref 14–18)
INR PPP: 1.1 (ref 0.9–1.1)
MCH RBC QN AUTO: 28.6 PG (ref 25–34)
MCHC RBC AUTO-ENTMCNC: 32.4 G/DL (ref 32–36)
MCV RBC AUTO: 88.1 FL (ref 80–100)
PMV BLD AUTO: 9.7 FL (ref 7.4–10.4)
POTASSIUM SERPL-SCNC: 4.2 MMOL/L (ref 3.5–5.1)
RED CELL DISTRIBUTION WIDTH CV: 15.2 % (ref 11.5–14.5)
RED CELL DISTRIBUTION WIDTH SD: 48.6 FL (ref 36.4–46.3)
SODIUM SERPL-SCNC: 137 MMOL/L (ref 136–145)
WBC # BLD AUTO: 11.88 K/UL (ref 4.8–10.8)

## 2018-01-05 RX ADMIN — ACETAMINOPHEN SCH MG: 500 TABLET, COATED ORAL at 05:37

## 2018-01-05 RX ADMIN — DOCUSATE SODIUM SCH MG: 100 CAPSULE, LIQUID FILLED ORAL at 08:45

## 2018-01-05 RX ADMIN — DEXTROSE MONOHYDRATE, SODIUM CHLORIDE, AND POTASSIUM CHLORIDE SCH MLS/HR: 50; 4.5; 1.49 INJECTION, SOLUTION INTRAVENOUS at 05:45

## 2018-01-05 RX ADMIN — FERROUS GLUCONATE SCH MG: 324 TABLET ORAL at 13:00

## 2018-01-05 RX ADMIN — CELECOXIB SCH MG: 200 CAPSULE ORAL at 08:45

## 2018-01-05 RX ADMIN — OXYCODONE HYDROCHLORIDE SCH MG: 10 TABLET, FILM COATED, EXTENDED RELEASE ORAL at 08:49

## 2018-01-05 RX ADMIN — ACETAMINOPHEN SCH MG: 500 TABLET, COATED ORAL at 13:28

## 2018-01-05 RX ADMIN — Medication SCH MG: at 08:46

## 2018-01-05 RX ADMIN — FERROUS GLUCONATE SCH MG: 324 TABLET ORAL at 08:46

## 2018-01-05 RX ADMIN — CEFAZOLIN SCH MLS/MIN: 10 INJECTION, POWDER, FOR SOLUTION INTRAVENOUS at 05:45

## 2018-01-05 NOTE — ORTHOPEDIC PROGRESS NOTE
Orthopedic Progress Note


Date of Service


Jan 5, 2018.





Subjective


Post OP Day:  1


Reports: feeling well, pain controlled w PO medications, Denies: complaints, 

chest pain, SOB, nausea / vomiting, light headedness, calf pain





Objective


calves soft nontender, N/V intact, capillary refill less than 2 sec., dressing C

/D/I, A&O x3, toes mobile











  Date Time  Temp Pulse Resp B/P (MAP) Pulse Ox O2 Delivery O2 Flow Rate FiO2


 


1/5/18 03:07 36.6 73 18 149/75 (99) 93 Room Air  


 


1/5/18 00:10      Room Air  


 


1/4/18 22:53 36.8 87 18 148/81 (103) 93 Room Air  


 


1/4/18 19:45 36.7 87 16 149/93 (111) 95 Room Air  


 


1/4/18 18:41 36.3 65 17 144/90 (108) 97 Nasal Cannula  


 


1/4/18 17:34 36.7 57 17 123/78 (93) 97 Nasal Cannula 2.0 


 


1/4/18 16:30     95 Nasal Cannula 2.0 


 


1/4/18 16:30     95 Nasal Cannula 2.0 


 


1/4/18 16:15  60 16 130/75 97 Nasal Cannula 2 


 


1/4/18 16:05  60 16 129/72 99 Oxymask 5 


 


1/4/18 15:55 36.3 59 16 130/78 99 Oxymask 10 


 


1/4/18 11:35 36.6 65 18 166/89 (114) 95 Room Air  








Laboratory Results 24 Hours:











Test


  1/5/18


05:59


 


Hematocrit 37.6 % 


 


Hemoglobin 12.2 g/dL 


 


Prothromb Time International


Ratio 1.1 


 


 


Prothrombin Time 11.1 SECONDS 











Assessment & Plan


Assessment:


POD#1 Right knee retinacular repair


Plan:


DVT - ASA


Medical Management


PT/OT for right knee. NO PT FOR LEFT KNEE


Discharge - Home today, OPPT for right knee. NO PT FOR LEFT KNEE

## 2018-01-09 NOTE — DISCHARGE SUMMARY
Orthopedic Discharge Summary


Admission Date/Reason


Jan 4, 2018 at 12:21


Failed Arthrotomy Right Knee.





Discharge Date/Disposition


Jan 5, 2018


Home





Diagnosis


Principal Diagnosis:


S/P arthrotomy repair 4 months S/P right TKA





Medication Reconciliation


as per discharge instructions





Admission Physical Exam


As per Admitting History & Physical.





Hospital Course


POD#1 patient was feeling well with no complaints. He had good pain control 

with PO medications. Dressing was clean, dry and intact. He was told to 

continue PT with his other knee that was operated on 3 weeks ago. He will be in 

a knee immobilizer for 6 weeks. No real PT for his right knee. He will follow 

up in the office 10-14 days after his surgery.





Discharge Instructions


Please refer to the electronic Patient Visit Report (Discharge Instructions) 

for additional information.